# Patient Record
Sex: FEMALE
[De-identification: names, ages, dates, MRNs, and addresses within clinical notes are randomized per-mention and may not be internally consistent; named-entity substitution may affect disease eponyms.]

---

## 2023-07-24 ENCOUNTER — NURSE TRIAGE (OUTPATIENT)
Dept: OTHER | Facility: CLINIC | Age: 43
End: 2023-07-24

## 2023-07-24 NOTE — TELEPHONE ENCOUNTER
Location of patient: [place]    Subjective: Caller states \"I have poison ivy and it keeps moving and itching. \"     Current Symptoms: poison ivy - posterior right forearm, anterior right thigh; raised red bumps, a few fluid filled; +warm to touch; itching - 8/10    Denies any redness, difficulty breathing, CP or SOB. Onset: 5 days ago; slow    Associated Symptoms: increased wakefulness due to itching    Pain Severity: denies pain    Temperature: denies fever or chills    What has been tried: benadryl gel; benadryl table, essential oils    LMP: NA Pregnant:  hysterectomy    Recommended disposition: See PCP within 24 Hours    Care advice provided, patient verbalizes understanding; denies any other questions or concerns; instructed to call back for any new or worsening symptoms. Patient/caller agrees to proceed to nearest [facility]  or telehealth visit     Writer warmed patient to [facility] , [facility], to check if patient has telehealth benefits. This triage is a result of a call to [address] [place] [address]. Please do not respond to the triage nurse through this encounter. Any subsequent communication should be directly with the patient.     Reason for Disposition   MODERATE to SEVERE itching (e.g., interferes with work, school, sleep, or other activities)    Protocols used: [address] [place] [address] Mer[place] - Sarah-ADULT-